# Patient Record
Sex: FEMALE | Race: AMERICAN INDIAN OR ALASKA NATIVE
[De-identification: names, ages, dates, MRNs, and addresses within clinical notes are randomized per-mention and may not be internally consistent; named-entity substitution may affect disease eponyms.]

---

## 2018-09-04 ENCOUNTER — HOSPITAL ENCOUNTER (EMERGENCY)
Dept: HOSPITAL 5 - ED | Age: 48
Discharge: HOME | End: 2018-09-04
Payer: SELF-PAY

## 2018-09-04 VITALS — DIASTOLIC BLOOD PRESSURE: 84 MMHG | SYSTOLIC BLOOD PRESSURE: 152 MMHG
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
## 2018-09-04 DIAGNOSIS — I10: ICD-10-CM
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
## 2018-09-04 DIAGNOSIS — S09.90XA: Primary | ICD-10-CM

## 2018-09-04 DIAGNOSIS — Z79.899: ICD-10-CM
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
## 2018-09-04 PROCEDURE — 99282 EMERGENCY DEPT VISIT SF MDM: CPT

## 2018-09-04 NOTE — EMERGENCY DEPARTMENT REPORT
ED Headache HPI





- General


Chief Complaint: Headache


Stated Complaint: SEVERE MIGRANE


Time Seen by Provider: 09/04/18 21:01





- History of Present Illness


Initial Comments: 


48-year-old Afro-American female states she fell off of a ground-level deck on 

Saturday striking her head on the house siding was no LOC patient was 

immediately ambulatory after incident patient did not seek care the day of 

accident as she had no pain was no laceration or abrasion no bleeding now 

presents for headache occipital and soreness there is no swelling no edema no 

nausea vomiting no blurred vision there is mild photophobia patient is amateur 

to baseline per patient stay there is been no fever no nosebleed no hemoptysis





Timing/Duration: other (3 days)


Quality: moderate


Head Injury Location: occipital


Recent Head Trauma: head trauma > 24 hrs ago


Modifying Factors: improves with: rest


Associated Symptoms: other (mild photophobia )


Allergies/Adverse Reactions: 


Allergies





No Known Allergies Allergy (Verified 12/04/16 08:24)


 








Home Medications: 


Ambulatory Orders





Dicyclomine [Bentyl] 10 mg PO QID PRN #20 capsule 12/10/16 


Famotidine [Pepcid] 20 mg PO QDAY #30 12/10/16 


Ondansetron [Zofran Odt] 4 mg PO QID PRN #20 tab.rapdis 12/10/16 


Acetaminophen [Acetaminophen TAB] 1,000 mg PO ONCE PRN #30 tablet 09/04/18 


Metoclopramide [Reglan TAB] 10 mg PO QID PRN #30 tablet 09/04/18 


predniSONE [Deltasone] 40 mg PO ONCE 5 Days #10 tablet 09/04/18 











ED Review of Systems


ROS: 


Stated complaint: SEVERE MIGRANE


Other details as noted in HPI





Constitutional: denies: chills, fever


Eyes: denies: eye pain, eye discharge, vision change


ENT: denies: ear pain, throat pain


Respiratory: denies: cough, shortness of breath, wheezing


Cardiovascular: denies: chest pain, palpitations


Endocrine: no symptoms reported


Gastrointestinal: denies: abdominal pain, nausea, diarrhea


Genitourinary: denies: urgency, dysuria, discharge


Musculoskeletal: denies: back pain, joint swelling, arthralgia


Skin: denies: rash, lesions


Neurological: as per HPI, headache.  denies: weakness, numbness, paresthesias, 

confusion, abnormal gait, vertigo


Psychiatric: denies: anxiety, depression


Hematological/Lymphatic: denies: easy bleeding, easy bruising





ED Past Medical Hx





- Past Medical History


Hx Hypertension: Yes (with preg)


Additional medical history: ulcers





- Surgical History


Additional Surgical History: gastric bypass 2/2012.  c/s x 1





- Social History


Smoking Status: Never Smoker


Substance Use Type: Alcohol





- Medications


Home Medications: 


 Home Medications











 Medication  Instructions  Recorded  Confirmed  Last Taken  Type


 


Dicyclomine [Bentyl] 10 mg PO QID PRN #20 capsule 12/10/16  Unknown Rx


 


Famotidine [Pepcid] 20 mg PO QDAY #30 12/10/16  Unknown Rx


 


Ondansetron [Zofran Odt] 4 mg PO QID PRN #20 tab.rapdis 12/10/16  Unknown Rx


 


Acetaminophen [Acetaminophen TAB] 1,000 mg PO ONCE PRN #30 tablet 09/04/18  

Unknown Rx


 


Metoclopramide [Reglan TAB] 10 mg PO QID PRN #30 tablet 09/04/18  Unknown Rx


 


predniSONE [Deltasone] 40 mg PO ONCE 5 Days #10 tablet 09/04/18  Unknown Rx














ED Physical Exam





- General


Limitations: No Limitations


General appearance: alert, in no apparent distress





- Head


Head exam: Present: normocephalic, normal inspection





- Expanded Head Exam


  ** Expanded


Head exam: Absent: laceration, abrasion, contusion, hematoma, racoon eyes, 

reyes's sign, general tenderness, tenderness of temporal artery, CSF rhinorrhea





- Eye


Eye exam: Present: normal appearance, PERRL, EOMI.  Absent: conjunctival 

injection, nystagmus, periorbital swelling, periorbital tenderness


Pupils: Present: normal accommodation





- Expanded Eye Exam


  ** Expanded


Eyelids: Normal Inspection: Left


Pupils: Regular, Round: Bilateral, Reactive: Bilateral


Sclera/Conjunctival: Normal Inspection: Bilateral


Anterior chamber: Normal Inspection: Bilateral


Visual acuity (R) = 20/: 30


Visual acuity (L) = 20/: 30





- ENT


ENT exam: Present: normal orophraynx, mucous membranes moist, TM's normal 

bilaterally, normal external ear exam





- Neck


Neck exam: Present: normal inspection, full ROM.  Absent: tenderness, 

meningismus, lymphadenopathy, thyromegaly





- Respiratory


Respiratory exam: Present: normal lung sounds bilaterally.  Absent: respiratory 

distress





- Cardiovascular


Cardiovascular Exam: Present: regular rate, normal rhythm.  Absent: systolic 

murmur, diastolic murmur, rubs, gallop





- GI/Abdominal


GI/Abdominal exam: Present: soft, normal bowel sounds





- Rectal


Rectal exam: Present: deferred





- Extremities Exam


Extremities exam: Present: normal inspection





- Back Exam


Back exam: Present: normal inspection





- Neurological Exam


Neurological exam: Present: alert, oriented X3, CN II-XII intact, normal gait, 

reflexes normal.  Absent: motor sensory deficit





- Psychiatric


Psychiatric exam: Present: normal affect, normal mood





- Skin


Skin exam: Present: warm, dry, intact, normal color.  Absent: rash





ED Course





 Vital Signs











  09/04/18





  17:24


 


Temperature 98.5 F


 


Pulse Rate 78


 


Respiratory 16





Rate 


 


Blood Pressure 152/84


 


O2 Sat by Pulse 100





Oximetry 














ED Medical Decision Making





- Medical Decision Making


This is a status post minor head injury there is no swelling or abrasion no 

laceration or hematoma no crepitus no step-off no posterior lateral neck pain 

it is supple neck is supple range of motion is intact without restriction 

patient's PERRLA EOMI conjunctivae are clear there is no nosebleed no oral 

blood no ear canal blood patient states pain is 4/10 aching exacerbated by 

movement and palpation relief by rest patient is A/O 3 and a toilet steady 

gait plan Tylenol Reglan Benadryl when necessary headache follow with PCP in 2-

3 days return to ED should symptoms worsen I nausea vomiting blurred vision had 

swelling patient verbalized understanding and agreement was signed will be DC'd 

home in stable condition at this time





Critical care attestation.: 


If time is entered above; I have spent that time in minutes in the direct care 

of this critically ill patient, excluding procedure time.








ED Disposition


Clinical Impression: 


Headache


Qualifiers:


 Headache type: unspecified Headache chronicity pattern: acute headache 

Intractability: not intractable Qualified Code(s): R51 - Headache





Minor head injury without loss of consciousness


Qualifiers:


 Encounter type: initial encounter Qualified Code(s): S09.90XA - Unspecified 

injury of head, initial encounter





Disposition: DC-01 TO HOME OR SELFCARE


Is pt being admited?: No


Does the pt Need Aspirin: No


Condition: Good


Instructions:  Minor Head Injury (ED), Acute Headache (ED)


Prescriptions: 


Acetaminophen [Acetaminophen TAB] 1,000 mg PO ONCE PRN #30 tablet


 PRN Reason: Headache


Metoclopramide [Reglan TAB] 10 mg PO QID PRN #30 tablet


 PRN Reason: Headache


predniSONE [Deltasone] 40 mg PO ONCE 5 Days #10 tablet


Referrals: 


PRIMARY CARE,MD [Primary Care Provider] - 3-5 Days


Forms:  Work/School Release Form(ED)


Time of Disposition: 21:14

## 2021-05-01 ENCOUNTER — HOSPITAL ENCOUNTER (EMERGENCY)
Dept: HOSPITAL 5 - ED | Age: 51
LOS: 1 days | Discharge: HOME | End: 2021-05-02
Payer: SELF-PAY

## 2021-05-01 DIAGNOSIS — K04.7: ICD-10-CM

## 2021-05-01 DIAGNOSIS — K02.9: Primary | ICD-10-CM

## 2021-05-01 DIAGNOSIS — I10: ICD-10-CM

## 2021-05-01 DIAGNOSIS — Z98.890: ICD-10-CM

## 2021-05-01 DIAGNOSIS — K08.89: ICD-10-CM

## 2021-05-01 DIAGNOSIS — Z79.899: ICD-10-CM

## 2021-05-01 DIAGNOSIS — Z91.14: ICD-10-CM

## 2021-05-01 PROCEDURE — 99282 EMERGENCY DEPT VISIT SF MDM: CPT

## 2021-05-02 VITALS — SYSTOLIC BLOOD PRESSURE: 190 MMHG | DIASTOLIC BLOOD PRESSURE: 105 MMHG

## 2021-05-02 NOTE — EMERGENCY DEPARTMENT REPORT
ED ENT HPI





- General


Chief complaint: Dental/Oral


Stated complaint: MOUTH PAIN/SWOLLEN JAW


Time Seen by Provider: 05/02/21 03:53


Source: patient


Mode of arrival: Ambulatory


Limitations: No Limitations





- History of Present Illness


Initial comments: 





Patient is a 50-year-old female presents emergency room with complaints of left 

upper dental pain that began 3 days ago.  She states that she has not seen a 

dentist in approximately 3 years.  She states that she was advised she had 

cavities at that time.  She states that she has noticed a small amount of s

welling to the face.  She states that it is causing her to have sore throat and 

ear pain.  She denies any fever, nausea, vomiting, diarrhea, difficulty 

swallowing, difficulty breathing.  She has a past medical history of 

hypertension, she has not seen a primary care doctor in 3 years, she states she 

believes she was previously on some medication before but does not know what the

name was.  No allergies to medications.  She is postmenopausal.





- Related Data


                                  Previous Rx's











 Medication  Instructions  Recorded  Last Taken  Type


 


Dicyclomine [Bentyl] 10 mg PO QID PRN #20 capsule 12/10/16 Unknown Rx


 


Famotidine [Pepcid] 20 mg PO QDAY #30 12/10/16 Unknown Rx


 


Ondansetron [Zofran Odt] 4 mg PO QID PRN #20 tab.rapdis 12/10/16 Unknown Rx


 


Acetaminophen [Acetaminophen TAB] 1,000 mg PO ONCE PRN #30 tablet 09/04/18 

Unknown Rx


 


Metoclopramide [Reglan TAB] 10 mg PO QID PRN #30 tablet 09/04/18 Unknown Rx


 


predniSONE [Deltasone] 40 mg PO ONCE 5 Days #10 tablet 09/04/18 Unknown Rx


 


Chlorhexidine Mouthwash [Peridex] 15 ml MM BID #1 bottle 05/02/21 Unknown Rx


 


Naproxen [EC-Naprosyn] 500 mg PO BID PRN #14 tablet. 05/02/21 Unknown Rx


 


Penicillin Vk [Veetids TAB] 500 mg PO QID 7 Days #56 tablet 05/02/21 Unknown Rx


 


amLODIPine 10 mg PO DAILY #30 tab 05/02/21 Unknown Rx











                                    Allergies











Allergy/AdvReac Type Severity Reaction Status Date / Time


 


No Known Allergies Allergy   Verified 12/04/16 08:24














ED Dental HPI





- General


Chief complaint: Dental/Oral


Stated complaint: MOUTH PAIN/SWOLLEN JAW


Time Seen by Provider: 05/02/21 03:53


Source: patient


Mode of arrival: Ambulatory


Limitations: No Limitations





- Related Data


                                  Previous Rx's











 Medication  Instructions  Recorded  Last Taken  Type


 


Dicyclomine [Bentyl] 10 mg PO QID PRN #20 capsule 12/10/16 Unknown Rx


 


Famotidine [Pepcid] 20 mg PO QDAY #30 12/10/16 Unknown Rx


 


Ondansetron [Zofran Odt] 4 mg PO QID PRN #20 tab.rapdis 12/10/16 Unknown Rx


 


Acetaminophen [Acetaminophen TAB] 1,000 mg PO ONCE PRN #30 tablet 09/04/18 

Unknown Rx


 


Metoclopramide [Reglan TAB] 10 mg PO QID PRN #30 tablet 09/04/18 Unknown Rx


 


predniSONE [Deltasone] 40 mg PO ONCE 5 Days #10 tablet 09/04/18 Unknown Rx


 


Chlorhexidine Mouthwash [Peridex] 15 ml MM BID #1 bottle 05/02/21 Unknown Rx


 


Naproxen [EC-Naprosyn] 500 mg PO BID PRN #14 tablet. 05/02/21 Unknown Rx


 


Penicillin Vk [Veetids TAB] 500 mg PO QID 7 Days #56 tablet 05/02/21 Unknown Rx


 


amLODIPine 10 mg PO DAILY #30 tab 05/02/21 Unknown Rx











                                    Allergies











Allergy/AdvReac Type Severity Reaction Status Date / Time


 


No Known Allergies Allergy   Verified 12/04/16 08:24














ED Review of Systems


ROS: 


Stated complaint: MOUTH PAIN/SWOLLEN JAW


Other details as noted in HPI





Comment: All other systems reviewed and negative





ED Past Medical Hx





- Past Medical History


Previous Medical History?: Yes


Hx Hypertension: Yes (with preg)


Additional medical history: ulcers





- Surgical History


Past Surgical History?: Yes


Additional Surgical History: gastric bypass 2/2012.  c/s x 1





- Social History


Smoking Status: Never Smoker


Substance Use Type: None





- Medications


Home Medications: 


                                Home Medications











 Medication  Instructions  Recorded  Confirmed  Last Taken  Type


 


Dicyclomine [Bentyl] 10 mg PO QID PRN #20 capsule 12/10/16  Unknown Rx


 


Famotidine [Pepcid] 20 mg PO QDAY #30 12/10/16  Unknown Rx


 


Ondansetron [Zofran Odt] 4 mg PO QID PRN #20 tab.rapdis 12/10/16  Unknown Rx


 


Acetaminophen [Acetaminophen TAB] 1,000 mg PO ONCE PRN #30 tablet 09/04/18  

Unknown Rx


 


Metoclopramide [Reglan TAB] 10 mg PO QID PRN #30 tablet 09/04/18  Unknown Rx


 


predniSONE [Deltasone] 40 mg PO ONCE 5 Days #10 tablet 09/04/18  Unknown Rx


 


Chlorhexidine Mouthwash [Peridex] 15 ml MM BID #1 bottle 05/02/21  Unknown Rx


 


Naproxen [EC-Naprosyn] 500 mg PO BID PRN #14 tablet. 05/02/21  Unknown Rx


 


Penicillin Vk [Veetids TAB] 500 mg PO QID 7 Days #56 tablet 05/02/21  Unknown Rx


 


amLODIPine 10 mg PO DAILY #30 tab 05/02/21  Unknown Rx














ED Physical Exam





- General


Limitations: No Limitations


General appearance: alert, in no apparent distress





- Head


Head exam: Present: atraumatic, normocephalic





- Eye


Eye exam: Present: normal appearance





- ENT


ENT exam: Present: normal orophraynx (No trismus, no muffled voice, no 

submandibular edema), mucous membranes moist, TM's normal bilaterally, normal 

external ear exam, other (very poor dentition, multiple areas of dental 

carries/dental decay and missing teeth, there is a prostetic device in place, 

there is induration of the left upper gumline where several carries are present,

there is trace edema of the left upper face, uvula is midline, no uvular edema 

or deviation)





- Respiratory


Respiratory exam: Absent: respiratory distress, accessory muscle use





- Neurological Exam


Neurological exam: Present: alert, oriented X3





- Psychiatric


Psychiatric exam: Present: normal affect, normal mood





- Skin


Skin exam: Present: warm, dry, intact





ED Course


                                   Vital Signs











  05/02/21 05/02/21





  02:21 04:44


 


Temperature 98.3 F 98.2 F


 


Pulse Rate 84 78


 


Respiratory 18 16





Rate  


 


Blood Pressure 188/109 


 


Blood Pressure  190/105





[Left]  


 


O2 Sat by Pulse 100 99





Oximetry  














ED Medical Decision Making





- Lab Data








                                   Vital Signs











  05/02/21 05/02/21





  02:21 04:44


 


Temperature 98.3 F 98.2 F


 


Pulse Rate 84 78


 


Respiratory 18 16





Rate  


 


Blood Pressure 188/109 


 


Blood Pressure  190/105





[Left]  


 


O2 Sat by Pulse 100 99





Oximetry  














- Medical Decision Making





Patient is a 50-year-old female presents emergency room with complaints of left 

upper dental pain that began 3 days ago.  She states that she has not seen a 

dentist in approximately 3 years.  She states that she was advised she had 

cavities at that time.  She states that she has noticed a small amount of 

swelling to the face.  She states that it is causing her to have sore throat and

 ear pain.  She denies any fever, nausea, vomiting, diarrhea, difficulty 

swallowing, difficulty breathing.  She has a past medical history of 

hypertension, she has not seen a primary care doctor in 3 years, she states she 

believes she was previously on some medication before but does not know what the

 name was.  No allergies to medications.  She is postmenopausal.  Vitals with 

elevated blood pressure, otherwise vitals are normal.  Patient has history of 

chronic hypertension has not been on medications for 3 years, she does not know 

what she previously took.  Patient is not having any symptoms related to her 

elevated blood pressure, she is asymptomatic.  The up-to-date medical literature

 does not recommend emergently lowering asymptomatic elevated blood pressure.  

Patient will be given a prescription for 5 mg of amlodipine, discussed the 

importance of primary care follow-up, discussed low-sodium diet, discussed 

increasing water intake, discussed exercise and weight management discussed to 

keep blood pressure log and take this to the primary care doctor.  On exam:very 

poor dentition, multiple areas of dental carries/dental decay and missing teeth,

 there is a prostetic device in place, there is induration of the left upper 

gumline where several carries are present, there is trace edema of the left 

upper face, uvula is midline, no uvular edema or deviation, No trismus, no 

muffled voice, no submandibular edema.  Symptoms and examination appear 

consistent with dental caries and dental abscess.  No clinical signs of facial 

cellulitis, facial abscess, Stanton's at this time.  Patient given prescription 

for naproxen, chlorhexidine mouthwash, penicillin VK.  Advised patient Please 

take medication as prescribed.  Gargle with warm salt water.  Increase your 

water intake.  Follow-up with a dentist.  Follow-up with your primary care 

doctor regarding elevation in your blood pressure.  Keep a blood pressure log 

and take this to the primary care doctor.  Eat a low-sodium diet.  Incorporate 

30 to 60 minutes of daily exercise and consider weight management.  Return to 

emergency room for any new or worsening symptoms.


Critical care attestation.: 


If time is entered above; I have spent that time in minutes in the direct care 

of this critically ill patient, excluding procedure time.








ED Disposition


Clinical Impression: 


 Dental caries, Dental abscess, Dentalgia, Non compliance w medication regimen





Hypertension


Qualifiers:


 Hypertension type: essential hypertension Qualified Code(s): I10 - Essential 

(primary) hypertension





Disposition: DC-01 TO HOME OR SELFCARE


Is pt being admited?: No


Does the pt Need Aspirin: No


Condition: Stable


Instructions:  Dental Abscess, Easy-to-Read, Low-Sodium Eating Plan, Managing 

Your Hypertension, Hypertension (ED)


Additional Instructions: 


Please take medication as prescribed.  Gargle with warm salt water.  Increase 

your water intake.  Follow-up with a dentist.  Follow-up with your primary care 

doctor regarding elevation in your blood pressure.  Keep a blood pressure log 

and take this to the primary care doctor.  Eat a low-sodium diet.  Incorporate 

30 to 60 minutes of daily exercise and consider weight management.  Return to 

emergency room for any new or worsening symptoms.


Prescriptions: 


amLODIPine 10 mg PO DAILY #30 tab


Naproxen [EC-Naprosyn] 500 mg PO BID PRN #14 tablet.dr


 PRN Reason: pain


Chlorhexidine Mouthwash [Peridex] 15 ml MM BID #1 bottle


Penicillin Vk [Veetids TAB] 500 mg PO QID 7 Days #56 tablet


Referrals: 


PRIMARY CARE,MD [Primary Care Provider] - 3-5 Days


ASHLEY MONTEIRO MD [Staff Physician] - 3-5 Days


[facility] [Provider Group] - 3-5 Days


[facility] [Outside] - 3-5 Days


[facility] [Outside] - 3-5 Days


[facility] [Outside] - 3-5 Days


[facility], [LAB/CONTRACT] - 3-5 Days


[facility] [Outside] - 3-5 Days


Forms:  Work/School Release Form(ED)


Time of Disposition: 04:07


Print Language: ENGLISH